# Patient Record
Sex: FEMALE | Race: WHITE
[De-identification: names, ages, dates, MRNs, and addresses within clinical notes are randomized per-mention and may not be internally consistent; named-entity substitution may affect disease eponyms.]

---

## 2019-09-25 LAB
ANION GAP SERPL CALC-SCNC: 15.4 MMOL/L (ref 8–16)
BASOPHILS NFR BLD AUTO: 0.4 % (ref 0–2)
BUN SERPL-MCNC: 10 MG/DL (ref 7–18)
CALCIUM SERPL-MCNC: 9.2 MG/DL (ref 8.5–10.1)
CHLORIDE SERPL-SCNC: 99 MMOL/L (ref 98–107)
CO2 SERPL-SCNC: 26.8 MMOL/L (ref 21–32)
CREAT SERPL-MCNC: 0.8 MG/DL (ref 0.6–1.3)
EOSINOPHIL NFR BLD: 1.2 % (ref 0–7)
ERYTHROCYTE [DISTWIDTH] IN BLOOD BY AUTOMATED COUNT: 13.3 % (ref 11.5–14.5)
GLUCOSE SERPL-MCNC: 150 MG/DL (ref 74–106)
HCT VFR BLD CALC: 42.3 % (ref 36–48)
HGB BLD-MCNC: 14.8 G/DL (ref 12–16)
IMM GRANULOCYTES NFR BLD: 1 % (ref 0–5)
LYMPHOCYTES NFR BLD AUTO: 14.3 % (ref 15–50)
MCH RBC QN AUTO: 32 PG (ref 26–34)
MCHC RBC AUTO-ENTMCNC: 35 G/DL (ref 31–37)
MCV RBC: 91.6 FL (ref 80–100)
MONOCYTES NFR BLD: 7.2 % (ref 2–11)
NEUTROPHILS NFR BLD AUTO: 75.9 % (ref 40–80)
OSMOLALITY SERPL CALC.SUM OF ELEC: 275 MOSM/KG (ref 275–300)
PLATELET # BLD: 320 10X3/UL (ref 130–400)
PMV BLD AUTO: 10.1 FL (ref 7.4–10.4)
POTASSIUM SERPL-SCNC: 4.2 MMOL/L (ref 3.5–5.1)
RBC # BLD AUTO: 4.62 10X6/UL (ref 4–5.4)
SODIUM SERPL-SCNC: 137 MMOL/L (ref 136–145)
WBC # BLD AUTO: 11.3 10X3/UL (ref 4.8–10.8)

## 2019-09-26 ENCOUNTER — HOSPITAL ENCOUNTER (OUTPATIENT)
Dept: HOSPITAL 84 - D.OPS | Age: 34
Discharge: HOME | End: 2019-09-26
Attending: SURGERY
Payer: COMMERCIAL

## 2019-09-26 VITALS — SYSTOLIC BLOOD PRESSURE: 146 MMHG | DIASTOLIC BLOOD PRESSURE: 87 MMHG

## 2019-09-26 VITALS — BODY MASS INDEX: 51.91 KG/M2 | WEIGHT: 293 LBS | HEIGHT: 63 IN | BODY MASS INDEX: 51.91 KG/M2

## 2019-09-26 DIAGNOSIS — D17.1: ICD-10-CM

## 2019-09-26 DIAGNOSIS — I10: ICD-10-CM

## 2019-09-26 DIAGNOSIS — J45.909: ICD-10-CM

## 2019-09-26 DIAGNOSIS — K80.80: Primary | ICD-10-CM

## 2019-09-26 DIAGNOSIS — E66.01: ICD-10-CM

## 2019-09-26 LAB — HCG UR QL: NEGATIVE

## 2019-09-26 NOTE — NUR
PATIENT AMBULATES TO BATHROOM AND VOIDS MODERATE AMOUNT URINE IN
TOILET.  AMBULATES WITHOUT DIZZINESS OR UNSTEADINESS.  DISCHARGE
INSTRUCTIONS REVIEWED WITH PATIENT, WALLY AGUIRRE'WILBUR WITH TIP INTACT

## 2019-09-27 NOTE — OP
PATIENT NAME:  RENETTA AMADOR                      MEDICAL RECORD: [id number]
:87                                             LOCATION:D.OPS          
                                                         ADMISSION DATE:        
SURGEON:  JEFFREY CARRILLO MD          
 
 
DATE OF OPERATION:  2019
 
PREOPERATIVE DIAGNOSES:
1.  Gallstones.
2.  Lipoma of the abdominal wall.
3.  Hypertension.
4.  Asthma.
5.  Morbid obesity with a BMI of 55.
 
POSTOPERATIVE DIAGNOSES:
1.  Gallstones.
2.  Lipoma of the abdominal wall.
3.  Hypertension.
4.  Asthma.
5.  Morbid obesity with a BMI of 55.
 
PROCEDURES:
1.  Laparoscopic cholecystectomy.
2.  Excision of 3 cm left lower quadrant abdominal lipoma.
 
SURGEON:  Jeffrey Carrillo MD
 
REPORT OF PROCEDURE:  The patient's abdomen was prepped and draped in sterile
fashion.  A skin incision was made on the superior aspect of the umbilicus, 0
Vicryls were placed in the fascia bilaterally and the fascia was incised with
15-blade.  I then bluntly entered the peritoneal cavity and placed a 12-mm
Lorene port.  Under direct visualization, a 5 mm trocar was placed in the
epigastrium and 2 more 5-mm trocars were placed in the right subcostal region. 
The gallbladder was grasped and elevated.  There were no signs of any
inflammatory changes, but there was a large stone present in the neck.  The
cystic artery and cystic duct were dissected free and these were clipped
proximally and distally and ligated in standard fashion.  The gallbladder was
then taken off the liver bed using electrocautery and placed into the right
upper quadrant.  Any bleeding from the liver bed was treated with
electrocautery.  At this point, the ports and insufflation were then removed and
the gallbladder was taken out through the umbilicus.  The umbilical fascia was
closed with interrupted 0 Vicryls times 3.  We then made a transverse incision
overlying the mass in the left lower quadrant of the abdomen.  We immediately
encountered a firm lipoma.  This lipoma was approximately 3 cm in greatest
diameter.  It was completely excised.  We then irrigated out the wounds with
normal saline and infused them with a total of 10 mL of 0.25% Marcaine with
epinephrine.  The skin incisions were all closed with subcutaneous 5-0 Monocryl
and dressed appropriately.
 
COMPLICATIONS:  None.
 
CONDITION:  Stable.
 
ANESTHESIA:  General endotracheal and local.
 
BLOOD LOSS:  Minimal.
 
 
 
 
OPERATIVE REPORT                               [id number]    RENETTA AMADOR    
 
 
TRANSINT:[id number] Voice Confirmation ID: [id number] DOCUMENT ID: [id number]
                                           
                                           JEFFREY CARRILLO MD          
 
 
 
Electronically Signed by JEFFREY CARRILLO on 19 at 1137
 
 
 
 
 
 
 
 
 
 
 
 
 
 
 
 
 
 
 
 
 
 
 
 
 
 
 
 
 
 
 
 
 
 
 
 
 
 
 
 
CC: KUSH HURD and DREW LCOO MD                        [id number]
DICTATION DATE: 19 0827     :     19 1137      [facility] 
                                                                      [address]
[place] [id number]